# Patient Record
Sex: MALE | Race: BLACK OR AFRICAN AMERICAN
[De-identification: names, ages, dates, MRNs, and addresses within clinical notes are randomized per-mention and may not be internally consistent; named-entity substitution may affect disease eponyms.]

---

## 2023-05-14 ENCOUNTER — HOSPITAL ENCOUNTER (EMERGENCY)
Dept: HOSPITAL 76 - ED | Age: 23
Discharge: HOME | End: 2023-05-14
Payer: COMMERCIAL

## 2023-05-14 VITALS — SYSTOLIC BLOOD PRESSURE: 155 MMHG | DIASTOLIC BLOOD PRESSURE: 101 MMHG

## 2023-05-14 DIAGNOSIS — T78.40XA: Primary | ICD-10-CM
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
## 2023-05-14 PROCEDURE — 99282 EMERGENCY DEPT VISIT SF MDM: CPT

## 2023-05-14 PROCEDURE — 99283 EMERGENCY DEPT VISIT LOW MDM: CPT

## 2023-05-14 NOTE — ED PHYSICIAN DOCUMENTATION
PD HPI SKIN





- Stated complaint


Stated Complaint: SKIN REACTION





- History obtained from


History obtained from: Patient





- History of Present Illness


Timing - onset: How many hours ago (2), Today


Timing - duration: Hours (2)


Timing - details: Abrupt onset, Still present, Waxing and waning


Location: Neck, LUE, Bodywide.  No: Scalp, Face


Quality / character: Itchy, Discolored, Raised (red welts diffusely.)


Improved by: Other (no meds tried yet.)


Associated symptoms: No: Fever, Myalgias, Joint pain, Headache, Facial swelling,

Dyspnea, N/V/D


Contributing factors: No: Exposed to medication, Exposed to food (last food was 

several hours prior without problems.), Exposed to soap / lotion, Recent illness


Similar symptoms before: Has not had sx before


Recently seen: Not recently seen





Review of Systems


Constitutional: denies: Fever, Chills


Nose: denies: Rhinorrhea / runny nose, Congestion


Throat: denies: Sore throat


Respiratory: reports: Dyspnea.  denies: Cough, Wheezing


GI: denies: Vomiting, Diarrhea


Skin: reports: Rash (current hives the past 2 hours.)





PD PAST MEDICAL HISTORY





- Past Medical History


Past Medical History: No





- Present Medications


Home Medications: 


                                Ambulatory Orders











 Medication  Instructions  Recorded  Confirmed


 


dexAMETHasone [Decadron] 4 mg PO DAILY #5 tablet 05/14/23 


 


diphenhydrAMINE [Benadryl] 25 mg PO Q4-6H PRN #15 cap 05/14/23 














- Allergies


Allergies/Adverse Reactions: 


                                    Allergies











Allergy/AdvReac Type Severity Reaction Status Date / Time


 


No Known Drug Allergies Allergy   Verified 05/14/23 01:55














PD ED PE NORMAL





- Vitals


Vital signs reviewed: Yes





- General


General: Alert and oriented X 3, No acute distress, Well developed/nourished





- HEENT


HEENT: Pharynx benign





- Neck


Neck: Supple, no meningeal sign, No adenopathy





- Cardiac


Cardiac: RRR, No murmur





- Respiratory


Respiratory: Clear bilaterally





- Abdomen


Abdomen: Soft, Non tender





- Derm


Derm: Normal color, Warm and dry, Other (Discrete rounded nonvesicular slightly 

raised rash spots consistent with hives on the arms and trunk.  Some around the 

neck.  Notable on the left forearm.)





Results





- Vitals


Vitals: 


                               Vital Signs - 24 hr











  05/14/23





  01:38


 


Temperature 37.1 C


 


Heart Rate 76


 


Respiratory 18





Rate 


 


Blood Pressure 155/101 H


 


O2 Saturation 96








                                     Oxygen











O2 Source                      Room air

















PD Medical Decision Making





- ED course


Complexity details: considered differential (Unclear the trigger for his hives 

allergic reaction.  I think his food intake several hours earlier was too 

remote.  No obvious new soaps detergents or medications.  We will treat with 

short course steroids and antihistamines with doses here.  Prescription written 

in case that lingers.), d/w patient





Departure





- Departure


Disposition: 01 Home, Self Care


Clinical Impression: 


Allergic reaction


Qualifiers:


 Encounter type: initial encounter Qualified Code(s): T78.40XA - Allergy, 

unspecified, initial encounter





Condition: Stable


Record reviewed to determine appropriate education?: Yes


Instructions:  ED Allergic Reaction General Other


Follow-Up: 


[facility] [place] [Provider Group]


Prescriptions: 


diphenhydrAMINE [Benadryl] 25 mg PO Q4-6H PRN #15 cap


 PRN Reason: Allergy Symptoms


dexAMETHasone [Decadron] 4 mg PO DAILY #5 tablet


Comments: 


Its not clear the cause of your allergic reaction.  I think the timing from when

 you wait to the onset is a bit too long to think the food is responsible.  

There are a lot of other things where exposed to in her environment.





With the reaction like this, and it is not unusual for her to be an isolated 

episode and not repetitive.  We treated with antihistamines and steroid 

medication.





We did give a dose of steroid as well as short and long-acting antihistamines 

here in the ER.  This should help quiet things down through the night.  That may

 be sufficient to have the reaction suppressed and not return or linger.





However if you do notice some lingering rash into tomorrow, you may need to take

 repeat antihistamine such as Benadryl every 4-6 hours and I would also add 

steroid dosing for a few more days if that is the case.





I wrote prescriptions for these in case.  Benadryl short acting antihistamine or

 Zyrtec/cetirizine longer acting antihistamine are available over-the-counter as

 well.





Recheck if this does not resolve completely over a couple of days or returns 

again in the near future as it may signify need for more exact allergy testing 

etc.


Discharge Date/Time: 05/14/23 02:16